# Patient Record
Sex: MALE
[De-identification: names, ages, dates, MRNs, and addresses within clinical notes are randomized per-mention and may not be internally consistent; named-entity substitution may affect disease eponyms.]

---

## 2023-07-23 ENCOUNTER — NURSE TRIAGE (OUTPATIENT)
Dept: OTHER | Facility: CLINIC | Age: 43
End: 2023-07-23

## 2023-07-23 NOTE — TELEPHONE ENCOUNTER
Location of patient: [place] is not with her son so a limited triage was completed    Subjective: Caller states \"My son has worms\"     Current Symptoms: Mother reports son has worms coming out of every orifice and would like him to get help. She feels he is going to die. He has not ate in 9 days and has sores all over his body. She reports he does have a history of drug abuse and living on the streets    Associated Symptoms: Parasites    Pain Severity: Does have pain in abdomen. Unable to rate the pain    Temperature: Unknown    What has been tried: Taking a parasite     Recommended disposition: Go to ED    Care advice provided, patient verbalizes understanding; denies any other questions or concerns. Outcome:  Will take son to ED if he is willing to go      This triage is a result of a call to the [address]      Reason for Disposition   Nursing judgment or information in reference    Protocols used: No Guideline Available-ADULT-